# Patient Record
Sex: MALE | Race: WHITE | NOT HISPANIC OR LATINO | Employment: FULL TIME | URBAN - METROPOLITAN AREA
[De-identification: names, ages, dates, MRNs, and addresses within clinical notes are randomized per-mention and may not be internally consistent; named-entity substitution may affect disease eponyms.]

---

## 2018-07-06 ENCOUNTER — OFFICE VISIT (OUTPATIENT)
Dept: URGENT CARE | Facility: CLINIC | Age: 28
End: 2018-07-06
Payer: COMMERCIAL

## 2018-07-06 VITALS
SYSTOLIC BLOOD PRESSURE: 136 MMHG | RESPIRATION RATE: 16 BRPM | WEIGHT: 180 LBS | HEIGHT: 75 IN | HEART RATE: 106 BPM | TEMPERATURE: 98.6 F | BODY MASS INDEX: 22.38 KG/M2 | DIASTOLIC BLOOD PRESSURE: 84 MMHG | OXYGEN SATURATION: 98 %

## 2018-07-06 DIAGNOSIS — M54.42 ACUTE LEFT-SIDED LOW BACK PAIN WITH LEFT-SIDED SCIATICA: Primary | ICD-10-CM

## 2018-07-06 PROCEDURE — 99204 OFFICE O/P NEW MOD 45 MIN: CPT | Performed by: PHYSICIAN ASSISTANT

## 2018-07-06 RX ORDER — IBUPROFEN 800 MG/1
800 TABLET ORAL EVERY 8 HOURS PRN
Qty: 30 TAB | Refills: 3 | Status: SHIPPED | OUTPATIENT
Start: 2018-07-06 | End: 2018-07-16

## 2018-07-06 RX ORDER — KETOROLAC TROMETHAMINE 30 MG/ML
60 INJECTION, SOLUTION INTRAMUSCULAR; INTRAVENOUS ONCE
Status: COMPLETED | OUTPATIENT
Start: 2018-07-06 | End: 2018-07-06

## 2018-07-06 RX ORDER — PREDNISONE 20 MG/1
TABLET ORAL
Qty: 23 TAB | Refills: 0 | Status: SHIPPED | OUTPATIENT
Start: 2018-07-06

## 2018-07-06 RX ORDER — CYCLOBENZAPRINE HCL 5 MG
5-10 TABLET ORAL 3 TIMES DAILY PRN
Qty: 30 TAB | Refills: 0 | Status: SHIPPED | OUTPATIENT
Start: 2018-07-06

## 2018-07-06 RX ADMIN — KETOROLAC TROMETHAMINE 60 MG: 30 INJECTION, SOLUTION INTRAMUSCULAR; INTRAVENOUS at 12:24

## 2018-07-06 NOTE — LETTER
July 6, 2018         Patient: Santy Gutierrez   YOB: 1990   Date of Visit: 7/6/2018           To Whom it May Concern:    Santy Gutierrez was seen in my clinic on 7/6/2018. He may return to work on 7/11/2018.    If you have any questions or concerns, please don't hesitate to call.        Sincerely,           Bharat Segal P.A.-C.  Electronically Signed

## 2018-07-07 NOTE — PROGRESS NOTES
"Subjective:      Pt is a 27 y.o. male who presents with Hip Pain (x1.5 week, lower back and (L) hip pain radiating to foot.)            HPI  Pt notes atraumatic left hip pain and lower back pain on the left with sciatica x 1.5 weeks after lifting at the gym and injuring himself. Pt notes OTC NSAIDs \"do nothing\". Pt denies bowel or bladder incontinence.  Pt has not taken any Rx medications for this condition. Pt states the pain is a 8-9/10, aching in nature and worse at night. Pt denies CP, SOB, NVD,  headaches, dizziness, change in vision, hives, or other joint pain. The pt's medication list, problem list, and allergies have been evaluated and reviewed during today's visit.    PMH:  Negative per pt.      PSH:  Negative per pt.      Fam Hx:  the patient's family history is not pertinent to their current complaint    Soc HX:  Social History     Social History   • Marital status: Single     Spouse name: N/A   • Number of children: N/A   • Years of education: N/A     Occupational History   • Not on file.     Social History Main Topics   • Smoking status: Never Smoker   • Smokeless tobacco: Never Used   • Alcohol use Yes      Comment: every other week    • Drug use: Yes     Types: Marijuana   • Sexual activity: Not on file     Other Topics Concern   • Not on file     Social History Narrative   • No narrative on file         Medications:    Current Outpatient Prescriptions:   •  cyclobenzaprine (FLEXERIL) 5 MG tablet, Take 1-2 Tabs by mouth 3 times a day as needed., Disp: 30 Tab, Rfl: 0  •  predniSONE (DELTASONE) 20 MG Tab, Take 3 tabs at once PO daily x 5 days, then take 2 tabs at once daily x 3 days, then take 1 tab PO daily x 2 days, Disp: 23 Tab, Rfl: 0  •  ibuprofen (MOTRIN) 800 MG Tab, Take 1 Tab by mouth every 8 hours as needed for up to 10 days., Disp: 30 Tab, Rfl: 3      Allergies:  Patient has no allergy information on record.    ROS    Constitutional: Negative for fever, chills and malaise/fatigue.   HENT: " "Negative for congestion and sore throat.    Eyes: Negative for blurred vision, double vision and photophobia.   Respiratory: Negative for cough and shortness of breath.    Cardiovascular: Negative for chest pain and palpitations.   Gastrointestinal: Negative for heartburn, nausea, vomiting, abdominal pain, diarrhea and constipation.   Genitourinary: Negative for dysuria and flank pain.   Musculoskeletal: POS for left sided back pain and sciatica with joint pain and myalgias.   Skin: Negative for itching and rash.   Neurological: Negative for dizziness, tingling and headaches.   Endo/Heme/Allergies: Does not bruise/bleed easily.   Psychiatric/Behavioral: Negative for depression. The patient is not nervous/anxious.         Objective:     /84   Pulse (!) 106   Temp 37 °C (98.6 °F)   Resp 16   Ht 1.905 m (6' 3\")   Wt 81.6 kg (180 lb)   SpO2 98%   BMI 22.50 kg/m²      Physical Exam   Musculoskeletal:        Lumbar back: He exhibits decreased range of motion, tenderness, pain and spasm. He exhibits no bony tenderness, no swelling, no edema, no deformity, no laceration and normal pulse.        Back:           Constitutional: PT is oriented to person, place, and time. PT appears well-developed and well-nourished. No distress.   HENT:   Head: Normocephalic and atraumatic.   Mouth/Throat: Oropharynx is clear and moist. No oropharyngeal exudate.   Eyes: Conjunctivae normal and EOM are normal. Pupils are equal, round, and reactive to light.   Neck: Normal range of motion. Neck supple. No thyromegaly present.   Cardiovascular: Normal rate, regular rhythm, normal heart sounds and intact distal pulses.  Exam reveals no gallop and no friction rub.    No murmur heard.  Pulmonary/Chest: Effort normal and breath sounds normal. No respiratory distress. PT has no wheezes. PT has no rales. Pt exhibits no tenderness.   Abdominal: Soft. Bowel sounds are normal. PT exhibits no distension and no mass. There is no tenderness. " There is no rebound and no guarding.   Neurological: PT is alert and oriented to person, place, and time. PT has normal reflexes. No cranial nerve deficit.   Skin: Skin is warm and dry. No rash noted. PT is not diaphoretic. No erythema.       Psychiatric: PT has a normal mood and affect. PT behavior is normal. Judgment and thought content normal.        Assessment/Plan:     1. Acute left-sided low back pain with left-sided sciatica    - cyclobenzaprine (FLEXERIL) 5 MG tablet; Take 1-2 Tabs by mouth 3 times a day as needed.  Dispense: 30 Tab; Refill: 0  - predniSONE (DELTASONE) 20 MG Tab; Take 3 tabs at once PO daily x 5 days, then take 2 tabs at once daily x 3 days, then take 1 tab PO daily x 2 days  Dispense: 23 Tab; Refill: 0  - ibuprofen (MOTRIN) 800 MG Tab; Take 1 Tab by mouth every 8 hours as needed for up to 10 days.  Dispense: 30 Tab; Refill: 3  - ketorolac (TORADOL) injection 60 mg; 2 mL by Intramuscular route Once.    RICE therapy discussed  Gentle ROM exercises discussed  WBAT BUE/BLE  Ice/heat therapy discussed  Rest, fluids encouraged.  AVS with medical info given.  Pt was in full understanding and agreement with the plan.  Follow-up as needed if symptoms worsen or fail to improve.